# Patient Record
Sex: FEMALE | Race: ASIAN | Employment: OTHER | URBAN - METROPOLITAN AREA
[De-identification: names, ages, dates, MRNs, and addresses within clinical notes are randomized per-mention and may not be internally consistent; named-entity substitution may affect disease eponyms.]

---

## 2017-06-27 PROBLEM — I48.91 ATRIAL FIBRILLATION (HCC): Status: ACTIVE | Noted: 2017-06-27

## 2017-06-27 PROBLEM — Z79.01 LONG TERM (CURRENT) USE OF ANTICOAGULANTS: Status: ACTIVE | Noted: 2017-06-27

## 2017-07-01 ENCOUNTER — LAB ENCOUNTER (OUTPATIENT)
Dept: LAB | Age: 77
End: 2017-07-01
Attending: INTERNAL MEDICINE
Payer: MEDICARE

## 2017-07-01 DIAGNOSIS — I48.20 CHRONIC ATRIAL FIBRILLATION (HCC): ICD-10-CM

## 2017-07-01 DIAGNOSIS — I10 ESSENTIAL HYPERTENSION: ICD-10-CM

## 2017-07-01 LAB
ALBUMIN SERPL-MCNC: 3.3 G/DL (ref 3.5–4.8)
ALP LIVER SERPL-CCNC: 76 U/L (ref 55–142)
ALT SERPL-CCNC: 26 U/L (ref 14–54)
AST SERPL-CCNC: 24 U/L (ref 15–41)
BASOPHILS # BLD AUTO: 0.04 X10(3) UL (ref 0–0.1)
BASOPHILS NFR BLD AUTO: 0.7 %
BILIRUB SERPL-MCNC: 1 MG/DL (ref 0.1–2)
BUN BLD-MCNC: 19 MG/DL (ref 8–20)
CALCIUM BLD-MCNC: 9.1 MG/DL (ref 8.3–10.3)
CHLORIDE: 105 MMOL/L (ref 101–111)
CO2: 27 MMOL/L (ref 22–32)
CREAT BLD-MCNC: 0.83 MG/DL (ref 0.55–1.02)
EOSINOPHIL # BLD AUTO: 0.15 X10(3) UL (ref 0–0.3)
EOSINOPHIL NFR BLD AUTO: 2.6 %
ERYTHROCYTE [DISTWIDTH] IN BLOOD BY AUTOMATED COUNT: 13.2 % (ref 11.5–16)
GLUCOSE BLD-MCNC: 90 MG/DL (ref 70–99)
HCT VFR BLD AUTO: 37.9 % (ref 34–50)
HGB BLD-MCNC: 11.8 G/DL (ref 12–16)
IMMATURE GRANULOCYTE COUNT: 0.02 X10(3) UL (ref 0–1)
IMMATURE GRANULOCYTE RATIO %: 0.3 %
INR BLD: 2.56 (ref 0.89–1.12)
LYMPHOCYTES # BLD AUTO: 2 X10(3) UL (ref 0.9–4)
LYMPHOCYTES NFR BLD AUTO: 34.2 %
M PROTEIN MFR SERPL ELPH: 8.1 G/DL (ref 6.1–8.3)
MCH RBC QN AUTO: 28.4 PG (ref 27–33.2)
MCHC RBC AUTO-ENTMCNC: 31.1 G/DL (ref 31–37)
MCV RBC AUTO: 91.3 FL (ref 81–100)
MONOCYTES # BLD AUTO: 0.45 X10(3) UL (ref 0.1–0.6)
MONOCYTES NFR BLD AUTO: 7.7 %
NEUTROPHIL ABS PRELIM: 3.19 X10 (3) UL (ref 1.3–6.7)
NEUTROPHILS # BLD AUTO: 3.19 X10(3) UL (ref 1.3–6.7)
NEUTROPHILS NFR BLD AUTO: 54.5 %
PLATELET # BLD AUTO: 192 10(3)UL (ref 150–450)
POTASSIUM SERPL-SCNC: 5.2 MMOL/L (ref 3.6–5.1)
PSA SERPL DL<=0.01 NG/ML-MCNC: 27.4 SECONDS (ref 11.8–14.1)
RBC # BLD AUTO: 4.15 X10(6)UL (ref 3.8–5.1)
RED CELL DISTRIBUTION WIDTH-SD: 44 FL (ref 35.1–46.3)
SODIUM SERPL-SCNC: 138 MMOL/L (ref 136–144)
WBC # BLD AUTO: 5.9 X10(3) UL (ref 4–13)

## 2017-07-01 PROCEDURE — 36415 COLL VENOUS BLD VENIPUNCTURE: CPT

## 2017-07-01 PROCEDURE — 85610 PROTHROMBIN TIME: CPT

## 2017-07-01 PROCEDURE — 85025 COMPLETE CBC W/AUTO DIFF WBC: CPT

## 2017-07-01 PROCEDURE — 80053 COMPREHEN METABOLIC PANEL: CPT

## 2017-07-05 NOTE — PROGRESS NOTES
Telephone Information:  Home Phone      [phone]  Work Phone      Not on file. Mobile          [phone]  Mobile          [phone]    Call placed. No answer. Left message to call the office back.

## 2017-07-06 NOTE — PROGRESS NOTES
[id number] with pt son, Samy Sorto.  (ok per Hippa/Phone consent)  Advised of MD comments. Verbalized understating. Labs ordered per MD instructions.

## 2019-10-23 PROBLEM — Z79.01 LONG TERM (CURRENT) USE OF ANTICOAGULANTS: Status: RESOLVED | Noted: 2017-06-27 | Resolved: 2019-10-23

## 2019-10-23 PROBLEM — I48.91 ATRIAL FIBRILLATION (HCC): Status: RESOLVED | Noted: 2017-06-27 | Resolved: 2019-10-23
